# Patient Record
Sex: MALE | Race: BLACK OR AFRICAN AMERICAN | Employment: FULL TIME | URBAN - METROPOLITAN AREA
[De-identification: names, ages, dates, MRNs, and addresses within clinical notes are randomized per-mention and may not be internally consistent; named-entity substitution may affect disease eponyms.]

---

## 2017-10-21 ENCOUNTER — HOSPITAL ENCOUNTER (OUTPATIENT)
Dept: GENERAL RADIOLOGY | Facility: HOSPITAL | Age: 42
Discharge: HOME | End: 2017-10-21
Attending: OTOLARYNGOLOGY
Payer: COMMERCIAL

## 2017-10-21 DIAGNOSIS — R13.10 DYSPHAGIA, UNSPECIFIED TYPE: ICD-10-CM

## 2017-10-21 PROCEDURE — 74220 X-RAY XM ESOPHAGUS 1CNTRST: CPT | Performed by: OTOLARYNGOLOGY

## 2017-11-11 ENCOUNTER — OFFICE VISIT (OUTPATIENT)
Dept: FAMILY MEDICINE CLINIC | Facility: CLINIC | Age: 42
End: 2017-11-11

## 2017-11-11 VITALS
DIASTOLIC BLOOD PRESSURE: 74 MMHG | RESPIRATION RATE: 18 BRPM | HEIGHT: 68 IN | WEIGHT: 213.19 LBS | SYSTOLIC BLOOD PRESSURE: 118 MMHG | BODY MASS INDEX: 32.31 KG/M2 | HEART RATE: 92 BPM

## 2017-11-11 DIAGNOSIS — E66.09 CLASS 1 OBESITY DUE TO EXCESS CALORIES WITHOUT SERIOUS COMORBIDITY WITH BODY MASS INDEX (BMI) OF 32.0 TO 32.9 IN ADULT: ICD-10-CM

## 2017-11-11 DIAGNOSIS — R06.83 SNORING: ICD-10-CM

## 2017-11-11 DIAGNOSIS — Z12.5 ENCOUNTER FOR SCREENING FOR MALIGNANT NEOPLASM OF PROSTATE: ICD-10-CM

## 2017-11-11 DIAGNOSIS — R73.03 PREDIABETES: ICD-10-CM

## 2017-11-11 DIAGNOSIS — Z00.00 LABORATORY EXAM ORDERED AS PART OF ROUTINE GENERAL MEDICAL EXAMINATION: ICD-10-CM

## 2017-11-11 DIAGNOSIS — Z00.00 ROUTINE GENERAL MEDICAL EXAMINATION AT A HEALTH CARE FACILITY: Primary | ICD-10-CM

## 2017-11-11 PROCEDURE — 99396 PREV VISIT EST AGE 40-64: CPT | Performed by: FAMILY MEDICINE

## 2017-11-11 NOTE — PROGRESS NOTES
Zaheer Guerra is a 40year old male who presents for a complete physical exam.   HPI:     It is accompanied by his wife is pleasant and supportive. Patient and wife complained about patient's snoring.   Patient's wife has witnessed him \"stop breathing\" stool/black stool/change in appetite  : denies nocturia or changes in urinary stream, denies scrotal mass/abnormal discharge from urethra  MUSCULOSKELETAL: denies joint or muscle aches or pains  NEURO: denies headaches/dizziness  PSYCH: denies depression obesity due to excess calories without serious comorbidity with body mass index (bmi) of 32.0 to 32.9 in adult      1. Routine general medical examination at a health care facility  Patient provided handout on men's health and prevention.     Routine health plan.  The patient is asked to return for CPX in 12 months and as needed. Also, for nurse visit in the Fall for influenza immunization.

## 2020-10-14 ENCOUNTER — TELEPHONE (OUTPATIENT)
Dept: FAMILY MEDICINE CLINIC | Facility: CLINIC | Age: 45
End: 2020-10-14

## 2025-06-02 NOTE — PROGRESS NOTES
Ava Mayes is a 49 year old male       Chief Complaint   Patient presents with    Wellness Visit    Fatigue    Sleep Problem    Other     Enlarged lymph node.  Vitamin D deficiency.  Prediabetes.           HPI:     Patient is accompanied by his wife who is pleasant and supportive.      New patient, patient last seen 11/11/2017        Poor sleep:  Patient denies snoring and wife corroborates this.  Patient believes this is what is causing his fatigue.      Prediabetes:  History of prediabetes.  Patient states since we last saw him he has worked on losing weight by exercising on a regular basis and watching his diet.      Vitamin D insufficiency:  Has been taking a vitamin D supplement.      Enlarged lymph node in neck:  Right side of neck.  Patient states it is getting smaller and is no longer painful.        Colonoscopy:   n/a.          Wt Readings from Last 6 Encounters:   06/02/25 192 lb (87.1 kg)   11/11/17 213 lb 3.2 oz (96.7 kg)   11/12/16 215 lb (97.5 kg)   03/12/16 209 lb (94.8 kg)   08/09/14 209 lb (94.8 kg)   06/14/14 207 lb (93.9 kg)     Body mass index is 29.44 kg/m².           Problem List[1]  Current Medications[2]   Past Medical History[3]   Past Surgical History[4]   Family History[5]   Social History:  Short Social Hx on File[6]     Occ: .  Marital Status: . Children: n/a.   Exercise: running and weights 4-5 times a week.    Diet: tries to closely watch his diet     REVIEW OF SYSTEMS:   GENERAL: Feels well overall. Denies fever or chills.  INTEGUMENT: Denies any new or changing skin lesions.  EYES: Denies changes in vision.  HENT: Denies upper respiratory symptoms.  LUNGS: Denies JONAS, wheezing, cough.  Dyspnea on exertion.  CARDIOVASCULAR: Denies CP or palpitations. Denies chest pain on exertion.  GI: Denies abdominal pain, denies heartburn, denies n/v/c/d/change in stools/blood in stool/black stool/change in appetite  : Denies nocturia or changes in urinary stream.  Denies scrotal mass/abnormal discharge from urethra.  MUSCULOSKELETAL: No complaints of joint or muscle aches or pains.  NEURO: Denies headaches/dizziness  PSYCH: Denies depression or anxiety  HEMATOLOGIC: No complaints of easy bruising/bleeding/anemia/blood clot disorders  ENDOCRINE: As in HPI      EXAM:   /66   Pulse 66   Temp 97.4 °F (36.3 °C) (Temporal)   Resp 21   Ht 5' 7.72\" (1.72 m)   Wt 192 lb (87.1 kg)   SpO2 99%   BMI 29.44 kg/m²   Body mass index is 29.44 kg/m².   GENERAL: NAD. Pleasant, well developed, nonill appearing -American male  INTEGUMENT: No visible rashes.  No visible suspicious lesions.  HENT: NCAT, EACs clear b/l, TMs normal b/l.  Nose: No nasal discharge.  OP: MMM.  Posteriorly no exudate or erythema.  EYES: EOMI.  Conjunctiva non-injected.  Non-icteric.  NECK: Supple. 1-2 enlarged lymph nodes of posterior cervical chain on the right that are nontender to palpation.  No other cervical or supraclavicular enlarged lymph nodes.  LUNGS: CTA A/P, no wheezes/ronchi/rales/crackles, normal air excursion  CARDIOVASCULAR: RRR, no murmur.  No lower extremity edema.  Abdomen: Flat.  Non-tender to palpation, no HSM/masses/pulsations  MUSCULOSKELETAL: Back with normal AROM, no joint swelling  EXTREMITIES: No cyanosis, clubbing, or edema  NEURO: A&O x3.  No gross motor or gross sensory abnormality.  PSYCH: Normal affect. No apparent thought disorder. Average judgement and insight.    Immunization History   Administered Date(s) Administered    FLUZONE 6 months and older PFS 0.5 ml (98809) 11/03/2020, 12/28/2021    TDAP 01/21/2012    Td, Preserv Free 06/02/2025         DATA:      Component      Latest Ref Rng 9/17/2016   WBC      4.0 - 13.0 x10(3) uL 5.9    RBC      4.30 - 5.70 x10(6)uL 5.01    Hemoglobin      13.0 - 17.0 g/dL 14.6    Hematocrit      37.0 - 53.0 % 44.2    Platelet Count      150.0 - 450.0 10(3)uL 199.0    MCV      80.0 - 99.0 fL 88.2    MCH      27.0 - 33.2 pg 29.1     MCHC      31.0 - 37.0 g/dL 33.0    RDW      11.5 - 16.0 % 13.0    RDW-SD      35.1 - 46.3 fL 41.6    Prelim Neutrophil Abs      1.30 - 6.70 x10 (3) uL 3.58    Neutrophils Absolute      1.30 - 6.70 x10(3) uL 3.58    Lymphocytes Absolute      0.90 - 4.00 x10(3) uL 1.70    Monocytes Absolute      0.10 - 0.60 x10(3) uL 0.49    Eosinophils Absolute      0.00 - 0.30 x10(3) uL 0.11    Basophils Absolute      0.00 - 0.10 x10(3) uL 0.04    Immature Granulocyte Absolute      0.00 - 1.00 x10(3) uL 0.01    Neutrophils %      % 60.2    Lymphocytes %      % 28.7    Monocytes %      % 8.3    Eosinophils %      % 1.9    Basophils %      % 0.7    Immature Granulocyte %      % 0.2    Glucose      70 - 99 mg/dL 104 (H)    BUN      8 - 20 mg/dL 15    CREATININE      0.70 - 1.30 mg/dL 1.31 (H)    GFR      >=60  78    CALCIUM      8.3 - 10.3 mg/dL 8.4    ALKALINE PHOSPHATASE      45 - 117 U/L 91    AST (SGOT)      15 - 41 U/L 14 (L)    ALT (SGPT)      17 - 63 U/L 36    Total Bilirubin      0.1 - 2.0 mg/dL 0.6    PROTEIN, TOTAL      6.1 - 8.3 g/dL 7.6    Albumin      3.5 - 4.8 g/dL 4.0    Sodium      136 - 144 mmol/L 139    Potassium      3.6 - 5.1 mmol/L 3.5 (L)    Chloride      101 - 111 mmol/L 107    Carbon Dioxide, Total      22.0 - 32.0 mmol/L 29.0    Cholesterol, Total      <200 mg/dL 165    Triglycerides      <150 mg/dL 48    HDL Cholesterol      >45 mg/dL 49    LDL Cholesterol Calc      <130 mg/dL 106    VLDL      5 - 40 mg/dL 10    Chol/HDL Ratio      <4.97  3.37    NON-HDL CHOLESTEROL      <130 mg/dL 116    HEMOGLOBIN A1c      <5.7 % 5.9 (H)    ESTIMATED AVERAGE GLUCOSE      68 - 126 mg/dL 123    TSH      0.350 - 5.500 mIU/mL 0.560    T4,Free (Direct)      0.9 - 1.8 ng/dL 1.0    VITAMIN D, 25-OH, TOTAL      30.0 - 100.0 ng/mL 27.4 (L)               ASSESSMENT AND PLAN:   Ava Mayes is a 49 year old male who presents for a complete physical exam.       Encounter Diagnoses   Name Primary?    Routine general medical  examination at a health care facility Yes    Screening for prostate cancer     Screen for colon cancer     Need for vaccination     Poor sleep     Fatigue, unspecified type     Prediabetes     Vitamin D insufficiency     Enlarged lymph node in neck            1. Routine general medical examination at a health care facility  Patient provided handout on men's health and prevention.    Routine health profile labs pending.  Patient encouraged to continue with healthy lifestyle.  Recommend healthy diet including green leafy vegetables, fresh fruits and protein.  Aerobic exercise for total of 150 minutes/week is recommended for cardiovascular fitness, and 45-60 minutes 6-7 days a week to help obtain weight loss.     - CBC With Differential With Platelet; Future  - Comp Metabolic Panel (14); Future  - Lipid Panel; Future  - Assay, Thyroid Stim Hormone; Future  - Free T4, (Free Thyroxine); Future    2. Screening for prostate cancer  - PSA, Total (Screening) [E]; Future    3. Screen for colon cancer  Patient referred to Dr. Sharma and colleagues for screening colonoscopy.    - Gastro Referral - External    4. Need for vaccination  - Td (Tenivac/Decavac) (>7 Years)    5. Poor sleep  Patient referred to Dr. Pratt and colleagues for further evaluation and care.    - Pulmonary Referral - In Network    6. Fatigue, unspecified type  Related to poor sleep?  Routine health profile labs ordered and pending, further recommendations pending results.    7. Prediabetes  A1c ordered and pending.    8. Vitamin D insufficiency  Reevaluate vitamin D level, further recommendations pending results.  - Vitamin D [E]; Future    9. Enlarged lymph node in neck  1-2 posterior cervical chain on the right.          Orders Placed This Encounter   Procedures    PSA, Total (Screening) [E]    Vitamin D [E]    CBC With Differential With Platelet    Comp Metabolic Panel (14)    Lipid Panel    Assay, Thyroid Stim Hormone    Free T4, (Free Thyroxine)     Hemoglobin A1C [E]    Td (Tenivac/Decavac) (>7 Years)       Imaging & Consults:  PULMONARY - INTERNAL  GASTRO - EXTERNAL  TETANUS AND DIPHTHERIA, PRESERVE FREE    Return in about 1 year (around 6/2/2026) for Annual Wellness Visit and as needed or indicated.              [1]   Patient Active Problem List  Diagnosis    Poor sleep    Fatigue    Enlarged lymph node in neck   [2]   Current Outpatient Medications   Medication Sig Dispense Refill    Multiple Vitamins-Minerals (ALIVE MENS ENERGY) Oral Tab Take 1 tablet by mouth daily.     [3]   Past Medical History:   Class 1 obesity due to excess calories without serious comorbidity with body mass index (BMI) of 32.0 to 32.9 in adult   [4] History reviewed. No pertinent surgical history.  [5]   Family History  Problem Relation Age of Onset    Diabetes Maternal Grandmother    [6]   Social History  Socioeconomic History    Marital status:    Tobacco Use    Smoking status: Never    Smokeless tobacco: Never   Vaping Use    Vaping status: Never Used   Substance and Sexual Activity    Alcohol use: Yes     Alcohol/week: 0.0 standard drinks of alcohol     Comment: very rare    Drug use: No   Other Topics Concern    Caffeine Concern No    Exercise No     Social Drivers of Health     Food Insecurity: No Food Insecurity (6/2/2025)    NCSS - Food Insecurity     Worried About Running Out of Food in the Last Year: No     Ran Out of Food in the Last Year: No   Transportation Needs: No Transportation Needs (6/2/2025)    NCSS - Transportation     Lack of Transportation: No   Housing Stability: Not At Risk (6/2/2025)    NCSS - Housing/Utilities     Has Housing: Yes     Worried About Losing Housing: No     Unable to Get Utilities: No

## 2025-06-09 NOTE — TELEPHONE ENCOUNTER
Patient’s spouse, Rosie, called to request that a hormonal panel be ordered for the patient.    Please review and advise on how to proceed.    Thank you.

## 2025-06-11 NOTE — TELEPHONE ENCOUNTER
Spoke with Sukhdev, patient's wife who is on verbal release. They would like his testosterone level checked.

## 2025-06-11 NOTE — TELEPHONE ENCOUNTER
What is the possible medical indication that the patient feels he needs a testosterone level checked?

## 2025-06-13 NOTE — TELEPHONE ENCOUNTER
Testosterone level ordered.  Please instruct patient to fast but drink plenty of water the morning of the blood draw and he should go as early in the morning as possible.

## 2025-06-13 NOTE — TELEPHONE ENCOUNTER
Spoke with patient. Informed of lab added. Reminded patient to fast but drink plenty pf water. Patient voiced understanding and agreeable.